# Patient Record
Sex: FEMALE | Race: WHITE | NOT HISPANIC OR LATINO
[De-identification: names, ages, dates, MRNs, and addresses within clinical notes are randomized per-mention and may not be internally consistent; named-entity substitution may affect disease eponyms.]

---

## 2017-06-21 ENCOUNTER — APPOINTMENT (OUTPATIENT)
Dept: ULTRASOUND IMAGING | Facility: CLINIC | Age: 77
End: 2017-06-21

## 2020-10-05 ENCOUNTER — APPOINTMENT (OUTPATIENT)
Dept: RADIOLOGY | Facility: CLINIC | Age: 80
End: 2020-10-05

## 2020-10-05 ENCOUNTER — APPOINTMENT (OUTPATIENT)
Dept: MRI IMAGING | Facility: CLINIC | Age: 80
End: 2020-10-05

## 2021-03-22 ENCOUNTER — RESULT REVIEW (OUTPATIENT)
Age: 81
End: 2021-03-22

## 2021-03-22 ENCOUNTER — APPOINTMENT (OUTPATIENT)
Dept: OBGYN | Facility: CLINIC | Age: 81
End: 2021-03-22
Payer: MEDICARE

## 2021-03-22 PROCEDURE — G0101: CPT

## 2021-03-22 PROCEDURE — 99072 ADDL SUPL MATRL&STAF TM PHE: CPT

## 2022-05-13 ENCOUNTER — EMERGENCY (EMERGENCY)
Facility: HOSPITAL | Age: 82
LOS: 1 days | Discharge: HOME | End: 2022-05-13
Attending: PERSONAL EMERGENCY RESPONSE ATTENDANT | Admitting: PERSONAL EMERGENCY RESPONSE ATTENDANT
Payer: MEDICARE

## 2022-05-13 VITALS
OXYGEN SATURATION: 98 % | RESPIRATION RATE: 16 BRPM | HEART RATE: 114 BPM | DIASTOLIC BLOOD PRESSURE: 88 MMHG | TEMPERATURE: 98 F | SYSTOLIC BLOOD PRESSURE: 139 MMHG

## 2022-05-13 PROCEDURE — 99053 MED SERV 10PM-8AM 24 HR FAC: CPT

## 2022-05-13 PROCEDURE — 99284 EMERGENCY DEPT VISIT MOD MDM: CPT
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
## 2022-05-14 VITALS
HEART RATE: 98 BPM | TEMPERATURE: 99 F | RESPIRATION RATE: 16 BRPM | SYSTOLIC BLOOD PRESSURE: 133 MMHG | DIASTOLIC BLOOD PRESSURE: 92 MMHG | OXYGEN SATURATION: 100 %

## 2022-05-14 PROCEDURE — 73564 X-RAY EXAM KNEE 4 OR MORE: CPT | Mod: 26,50

## 2022-05-14 RX ORDER — TETANUS TOXOID, REDUCED DIPHTHERIA TOXOID AND ACELLULAR PERTUSSIS VACCINE, ADSORBED 5; 2.5; 8; 8; 2.5 [IU]/.5ML; [IU]/.5ML; UG/.5ML; UG/.5ML; UG/.5ML
0.5 SUSPENSION INTRAMUSCULAR ONCE
Refills: 0 | Status: COMPLETED | OUTPATIENT
Start: 2022-05-14 | End: 2022-05-14

## 2022-05-14 RX ADMIN — TETANUS TOXOID, REDUCED DIPHTHERIA TOXOID AND ACELLULAR PERTUSSIS VACCINE, ADSORBED 0.5 MILLILITER(S): 5; 2.5; 8; 8; 2.5 SUSPENSION INTRAMUSCULAR at 02:47
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
## 2022-05-14 NOTE — ED PROVIDER NOTE - NSFOLLOWUPINSTRUCTIONS_ED_ALL_ED_FT
Please follow up with your primary care doctor.     You may take tylenol or ibuprofen for pain. To control your pain at home, you should take Ibuprofen 400 mg along with Tylenol 650mg-1000mg every 6 to 8 hours. Limit your maximum daily Tylenol from all sources to 4000mg. Be aware that many other medications contain acetaminophen which is also known as Tylenol. Taking Tylenol and Ibuprofen together has been shown to be more effective at relieving pain than taking them separately. These are both over the counter medications that you can  at your local pharmacy without a prescription. You need to respect all of the warnings on the bottles. You shouldn’t take these medications for more than a week without following up with your doctor. Both medications come with certain risks and side effects that you need to discuss with your doctor, especially if you are taking them for a prolonged period    Please return to the emergency department with any new or worsening symptoms, including:  -Severe headache  -You are unsteady on your feet  -You are confused or faint  -Chest pain  -Shortness of breath        Fall Prevention    WHAT YOU NEED TO KNOW:    Fall prevention includes ways to make your home and other areas safer. It also includes ways you can move more carefully to prevent a fall. Health conditions that cause changes in your blood pressure, vision, or muscle strength and coordination may increase your risk for falls. Medicines may also increase your risk for falls if they make you dizzy, weak, or sleepy.    DISCHARGE INSTRUCTIONS:    Call 911 or have someone else call if:  You have fallen and are unconscious.  You have fallen and cannot move part of your body.  Contact your healthcare provider if:  You have fallen and have pain or a headache.  You have questions or concerns about your condition or care.  Fall prevention tips:  Stand or sit up slowly. This may help you keep your balance and prevent falls.  Use assistive devices as directed. Your healthcare provider may suggest that you use a cane or walker to help you keep your balance. You may need to have grab bars put in your bathroom near the toilet or in the shower.  Wear shoes that fit well and have soles that . Wear shoes both inside and outside. Use slippers with good . Do not wear shoes with high heels.  Wear a personal alarm. This is a device that allows you to call 911 if you fall and need help. Ask your healthcare provider for more information.  Stay active. Exercise can help strengthen your muscles and improve your balance. Your healthcare provider may recommend water aerobics or walking. He or she may also recommend physical therapy to improve your coordination. Never start an exercise program without talking to your healthcare provider first.  Walking for Exercise  Manage your medical conditions. Keep all appointments with your healthcare providers. Visit your eye doctor as directed.  Home safety tips:  Fall Prevention for Adults  Add items to prevent falls in the bathroom. Put nonslip strips on your bath or shower floor to prevent you from slipping. Use a bath mat if you do not have carpet in the bathroom. This will prevent you from falling when you step out of the bath or shower. Use a shower seat so you do not need to stand while you shower. Sit on the toilet or a chair in your bathroom to dry yourself and put on clothing. This will prevent you from losing your balance from drying or dressing yourself while you are standing.  Keep paths clear. Remove books, shoes, and other objects from walkways and stairs. Place cords for telephones and lamps out of the way so that you do not need to walk over them. Tape them down if you cannot move them. Remove small rugs. If you cannot remove a rug, secure it with double-sided tape. This will prevent you from tripping.  Install bright lights in your home. Use night lights to help light paths to the bathroom or kitchen. Always turn on the light before you start walking.  Keep items you use often on shelves within reach. Do not use a step stool to help you reach an item.  Paint or place reflective tape on the edges of your stairs. This will help you see the stairs better.  Follow up with your healthcare provider as directed: Write down your questions so you remember to ask them during your visits.
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
## 2022-05-14 NOTE — ED PROVIDER NOTE - NS ED ROS FT
CONST: no fevers, no chills  EYES: no pain, no vision changes  ENT: no sore throat, no ear pain, no change in hearing  CV: no chest pain, no leg swelling  RESP: no shortness of breath, no cough  ABD: no abdominal pain, no nausea, no vomiting, no diarrhea  : no dysuria, no flank pain, no hematuria  MSK: no back pain, no extremity pain  NEURO: no headache or additional neurologic complaints  HEME: no easy bleeding  SKIN:  no rash, +skin abrasions

## 2022-05-14 NOTE — ED PROVIDER NOTE - PATIENT PORTAL LINK FT
You can access the FollowMyHealth Patient Portal offered by [facility] by registering at the following website: [email or web address]. By joining [employer]’s FollowMyHealth portal, you will also be able to view your health information using other applications (apps) compatible with our system.

## 2022-05-14 NOTE — PROVIDER CONTACT NOTE (OTHER) - ASSESSMENT
SW assistance requested by MD for d/c transport. Writer met with pt at bedside to discuss options. Pt reports no longer needing a taxi as son, Eduardo, called and would be coming to pick pt up. SW remains available as needed.
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
## 2022-05-14 NOTE — ED ADULT NURSE NOTE - NSIMPLEMENTINTERV_GEN_ALL_ED
Implemented All Fall Risk Interventions:  [place] to call system. Call bell, personal items and telephone within reach. Instruct patient to call for assistance. Room bathroom lighting operational. Non-slip footwear when patient is off stretcher. Physically safe environment: no spills, clutter or unnecessary equipment. Stretcher in lowest position, wheels locked, appropriate side rails in place. Provide visual cue, wrist band, yellow gown, etc. Monitor gait and stability. Monitor for mental status changes and reorient to person, place, and time. Review medications for side effects contributing to fall risk. Reinforce activity limits and safety measures with patient and family.

## 2022-05-14 NOTE — ED PROVIDER NOTE - PROGRESS NOTE DETAILS
[place] Chris PGY-2: Patient ambulatory without issue. In no pain. XR with R knee effusion, no acute pathology. To RAY.

## 2022-05-14 NOTE — ED PROVIDER NOTE - CLINICAL SUMMARY MEDICAL DECISION MAKING FREE TEXT BOX
81 year old F with PMH HTN, hypothyroidism presenting with bilateral knee injuries. VSS, well appearing. AOx3, GCS 15. Remembers all events of the evening. No concern for acute hypothermia. Given patient has non focal neuro exam and is AO, will defer CTH. To get bilateral knee xrays (r/o fracture, dislocation), TDAP. If ambulatory without issue, DC.

## 2022-05-14 NOTE — ED PROVIDER NOTE - PHYSICAL EXAMINATION
GENERAL: Awake, alert, NAD  HEENT: NC/AT, moist mucous membranes, PERRL, EOMI  LUNGS: CTAB, no wheezes or crackles   CARDIAC: RRR, no m/r/g  ABDOMEN: Soft, normal BS, non tender, non distended, no rebound, no guarding  BACK: No midline spinal tenderness, no CVA tenderness  EXT: No edema, no calf tenderness, 2+ DP pulses bilaterally, no deformities. R knee effusion. Full ROM bilateral knee joints, no patellar tenderness, distally neurovascularly intact.   NEURO: A&Ox3. Moving all extremities.  SKIN: Warm and dry. No rash. Superficial abrasions to knees bilaterally.   PSYCH: Normal affect. GENERAL: Awake, alert, NAD  HEENT: NC/AT, moist mucous membranes, PERRL, EOMI  LUNGS: CTAB, no wheezes or crackles   CARDIAC: RRR, no m/r/g  ABDOMEN: Soft, normal BS, non tender, non distended, no rebound, no guarding  BACK: No midline spinal tenderness, no CVA tenderness  EXT: No edema, no calf tenderness, 2+ DP pulses bilaterally, no deformities. R knee effusion. Full ROM bilateral knee joints, no patellar tenderness, distally neurovascularly intact.   NEURO: A&Ox3. Moving all extremities. GCS 15.   SKIN: Warm and dry. No rash. Superficial abrasions to knees bilaterally.   PSYCH: Normal affect.

## 2023-01-04 PROBLEM — E03.9 HYPOTHYROIDISM, UNSPECIFIED: Chronic | Status: ACTIVE | Noted: 2022-05-14

## 2023-01-04 PROBLEM — I10 ESSENTIAL (PRIMARY) HYPERTENSION: Chronic | Status: ACTIVE | Noted: 2022-05-14

## 2023-01-25 ENCOUNTER — APPOINTMENT (OUTPATIENT)
Dept: OTOLARYNGOLOGY | Facility: CLINIC | Age: 83
End: 2023-01-25
Payer: MEDICARE

## 2023-01-25 VITALS
HEART RATE: 99 BPM | WEIGHT: 180 LBS | BODY MASS INDEX: 29.99 KG/M2 | TEMPERATURE: 97.7 F | SYSTOLIC BLOOD PRESSURE: 167 MMHG | HEIGHT: 65 IN | DIASTOLIC BLOOD PRESSURE: 88 MMHG

## 2023-01-25 DIAGNOSIS — H91.90 UNSPECIFIED HEARING LOSS, UNSPECIFIED EAR: ICD-10-CM

## 2023-01-25 PROCEDURE — 99203 OFFICE O/P NEW LOW 30 MIN: CPT | Mod: 25

## 2023-01-25 PROCEDURE — 69210 REMOVE IMPACTED EAR WAX UNI: CPT

## 2023-01-25 NOTE — END OF VISIT
[FreeTextEntry3] : I personally saw and examined EBEN WILKS in detail.  I spoke to ITZ Ho regarding the assessment and plan of care. I performed the procedures and relevant physical exam.  I have reviewed the above assessment and plan of care and I agree.  I have made changes to the body of the note wherever necessary and appropriate.\par

## 2023-01-25 NOTE — ASSESSMENT
[FreeTextEntry1] : Ms. WILKS is a 82 year female with hearing loss. On exam she has bilateral impacted cerumen\par \par - cerumen cleared today\par - Audio to be done at [facility]\par annual f/u recommend for ear check \par

## 2023-01-25 NOTE — HISTORY OF PRESENT ILLNESS
[de-identified] : Ms. WILKS is a 82 year female sent by Audiologist for ear check, she has been using hearing aids for many years. denies pain, tinnitus or discharge\par uses [facility] for her audiology needs
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
## 2023-11-09 ENCOUNTER — APPOINTMENT (OUTPATIENT)
Dept: OTOLARYNGOLOGY | Facility: CLINIC | Age: 83
End: 2023-11-09
Payer: MEDICARE

## 2023-11-09 VITALS
DIASTOLIC BLOOD PRESSURE: 84 MMHG | TEMPERATURE: 97.7 F | WEIGHT: 180 LBS | BODY MASS INDEX: 29.99 KG/M2 | HEART RATE: 89 BPM | HEIGHT: 65 IN | SYSTOLIC BLOOD PRESSURE: 158 MMHG

## 2023-11-09 PROCEDURE — 69200 CLEAR OUTER EAR CANAL: CPT | Mod: LT

## 2023-11-09 PROCEDURE — 99214 OFFICE O/P EST MOD 30 MIN: CPT | Mod: 25

## 2023-11-10 ENCOUNTER — APPOINTMENT (OUTPATIENT)
Dept: OTOLARYNGOLOGY | Facility: CLINIC | Age: 83
End: 2023-11-10
Payer: MEDICARE

## 2023-11-10 VITALS — BODY MASS INDEX: 29.99 KG/M2 | WEIGHT: 180 LBS | HEIGHT: 65 IN

## 2023-11-10 DIAGNOSIS — H61.23 IMPACTED CERUMEN, BILATERAL: ICD-10-CM

## 2023-11-10 DIAGNOSIS — T16.2XXA FOREIGN BODY IN LEFT EAR, INITIAL ENCOUNTER: ICD-10-CM

## 2023-11-10 DIAGNOSIS — H90.3 SENSORINEURAL HEARING LOSS, BILATERAL: ICD-10-CM

## 2023-11-10 PROCEDURE — 69200 CLEAR OUTER EAR CANAL: CPT | Mod: LT

## 2023-11-10 PROCEDURE — 99213 OFFICE O/P EST LOW 20 MIN: CPT | Mod: 25

## 2023-11-10 PROCEDURE — 69210 REMOVE IMPACTED EAR WAX UNI: CPT | Mod: RT

## 2024-05-21 ENCOUNTER — EMERGENCY (EMERGENCY)
Facility: HOSPITAL | Age: 84
LOS: 1 days | Discharge: HOME | End: 2024-05-21
Attending: EMERGENCY MEDICINE | Admitting: EMERGENCY MEDICINE
Payer: MEDICARE

## 2024-05-21 VITALS
RESPIRATION RATE: 18 BRPM | DIASTOLIC BLOOD PRESSURE: 84 MMHG | OXYGEN SATURATION: 97 % | TEMPERATURE: 98 F | SYSTOLIC BLOOD PRESSURE: 149 MMHG | HEART RATE: 115 BPM

## 2024-05-21 VITALS
OXYGEN SATURATION: 98 % | RESPIRATION RATE: 18 BRPM | TEMPERATURE: 98 F | HEART RATE: 77 BPM | SYSTOLIC BLOOD PRESSURE: 138 MMHG | DIASTOLIC BLOOD PRESSURE: 68 MMHG

## 2024-05-21 LAB
ALBUMIN SERPL ELPH-MCNC: 4 G/DL — SIGNIFICANT CHANGE UP (ref 3.3–5)
ALP SERPL-CCNC: 69 U/L — SIGNIFICANT CHANGE UP (ref 40–120)
ALT FLD-CCNC: 35 U/L — HIGH (ref 4–33)
ANION GAP SERPL CALC-SCNC: 15 MMOL/L — HIGH (ref 7–14)
APPEARANCE UR: ABNORMAL
APTT BLD: 29.3 SEC — SIGNIFICANT CHANGE UP (ref 24.5–35.6)
AST SERPL-CCNC: 32 U/L — SIGNIFICANT CHANGE UP (ref 4–32)
BACTERIA # UR AUTO: ABNORMAL /HPF
BASE EXCESS BLDV CALC-SCNC: 0.9 MMOL/L — SIGNIFICANT CHANGE UP (ref -2–3)
BASE EXCESS BLDV CALC-SCNC: 2.2 MMOL/L — SIGNIFICANT CHANGE UP (ref -2–3)
BASOPHILS # BLD AUTO: 0.04 K/UL — SIGNIFICANT CHANGE UP (ref 0–0.2)
BASOPHILS NFR BLD AUTO: 0.3 % — SIGNIFICANT CHANGE UP (ref 0–2)
BILIRUB DIRECT SERPL-MCNC: <0.2 MG/DL — SIGNIFICANT CHANGE UP (ref 0–0.3)
BILIRUB INDIRECT FLD-MCNC: >0.3 MG/DL — SIGNIFICANT CHANGE UP (ref 0–1)
BILIRUB SERPL-MCNC: 0.5 MG/DL — SIGNIFICANT CHANGE UP (ref 0.2–1.2)
BILIRUB SERPL-MCNC: 0.5 MG/DL — SIGNIFICANT CHANGE UP (ref 0.2–1.2)
BILIRUB UR-MCNC: NEGATIVE — SIGNIFICANT CHANGE UP
BLD GP AB SCN SERPL QL: NEGATIVE — SIGNIFICANT CHANGE UP
BUN SERPL-MCNC: 13 MG/DL — SIGNIFICANT CHANGE UP (ref 7–23)
CA-I SERPL-SCNC: 1.17 MMOL/L — SIGNIFICANT CHANGE UP (ref 1.15–1.33)
CA-I SERPL-SCNC: 1.21 MMOL/L — SIGNIFICANT CHANGE UP (ref 1.15–1.33)
CALCIUM SERPL-MCNC: 9.6 MG/DL — SIGNIFICANT CHANGE UP (ref 8.4–10.5)
CAST: 0 /LPF — SIGNIFICANT CHANGE UP (ref 0–4)
CHLORIDE BLDV-SCNC: 104 MMOL/L — SIGNIFICANT CHANGE UP (ref 96–108)
CHLORIDE BLDV-SCNC: 106 MMOL/L — SIGNIFICANT CHANGE UP (ref 96–108)
CHLORIDE SERPL-SCNC: 102 MMOL/L — SIGNIFICANT CHANGE UP (ref 98–107)
CO2 BLDV-SCNC: 27.3 MMOL/L — HIGH (ref 22–26)
CO2 BLDV-SCNC: 30.4 MMOL/L — HIGH (ref 22–26)
CO2 SERPL-SCNC: 23 MMOL/L — SIGNIFICANT CHANGE UP (ref 22–31)
COLOR SPEC: YELLOW — SIGNIFICANT CHANGE UP
CREAT SERPL-MCNC: 0.56 MG/DL — SIGNIFICANT CHANGE UP (ref 0.5–1.3)
DIFF PNL FLD: ABNORMAL
EGFR: 90 ML/MIN/1.73M2 — SIGNIFICANT CHANGE UP
EOSINOPHIL # BLD AUTO: 0.04 K/UL — SIGNIFICANT CHANGE UP (ref 0–0.5)
EOSINOPHIL NFR BLD AUTO: 0.3 % — SIGNIFICANT CHANGE UP (ref 0–6)
FLUAV AG NPH QL: SIGNIFICANT CHANGE UP
FLUBV AG NPH QL: SIGNIFICANT CHANGE UP
GAS PNL BLDV: 134 MMOL/L — LOW (ref 136–145)
GAS PNL BLDV: 136 MMOL/L — SIGNIFICANT CHANGE UP (ref 136–145)
GAS PNL BLDV: SIGNIFICANT CHANGE UP
GAS PNL BLDV: SIGNIFICANT CHANGE UP
GLUCOSE BLDV-MCNC: 126 MG/DL — HIGH (ref 70–99)
GLUCOSE BLDV-MCNC: 153 MG/DL — HIGH (ref 70–99)
GLUCOSE SERPL-MCNC: 153 MG/DL — HIGH (ref 70–99)
GLUCOSE UR QL: NEGATIVE MG/DL — SIGNIFICANT CHANGE UP
HCO3 BLDV-SCNC: 26 MMOL/L — SIGNIFICANT CHANGE UP (ref 22–29)
HCO3 BLDV-SCNC: 29 MMOL/L — SIGNIFICANT CHANGE UP (ref 22–29)
HCT VFR BLD CALC: 44.6 % — SIGNIFICANT CHANGE UP (ref 34.5–45)
HCT VFR BLDA CALC: 46 % — SIGNIFICANT CHANGE UP (ref 34.5–46.5)
HCT VFR BLDA CALC: 47 % — HIGH (ref 34.5–46.5)
HGB BLD CALC-MCNC: 15.3 G/DL — SIGNIFICANT CHANGE UP (ref 11.7–16.1)
HGB BLD CALC-MCNC: 15.5 G/DL — SIGNIFICANT CHANGE UP (ref 11.7–16.1)
HGB BLD-MCNC: 15.1 G/DL — SIGNIFICANT CHANGE UP (ref 11.5–15.5)
IANC: 11.53 K/UL — HIGH (ref 1.8–7.4)
IMM GRANULOCYTES NFR BLD AUTO: 0.4 % — SIGNIFICANT CHANGE UP (ref 0–0.9)
INR BLD: 1.1 RATIO — SIGNIFICANT CHANGE UP (ref 0.85–1.18)
KETONES UR-MCNC: NEGATIVE MG/DL — SIGNIFICANT CHANGE UP
LACTATE BLDV-MCNC: 2.2 MMOL/L — HIGH (ref 0.5–2)
LACTATE BLDV-MCNC: 2.5 MMOL/L — HIGH (ref 0.5–2)
LEUKOCYTE ESTERASE UR-ACNC: ABNORMAL
LIDOCAIN IGE QN: 17 U/L — SIGNIFICANT CHANGE UP (ref 7–60)
LYMPHOCYTES # BLD AUTO: 1.54 K/UL — SIGNIFICANT CHANGE UP (ref 1–3.3)
LYMPHOCYTES # BLD AUTO: 10.9 % — LOW (ref 13–44)
MAGNESIUM SERPL-MCNC: 2 MG/DL — SIGNIFICANT CHANGE UP (ref 1.6–2.6)
MCHC RBC-ENTMCNC: 31.1 PG — SIGNIFICANT CHANGE UP (ref 27–34)
MCHC RBC-ENTMCNC: 33.9 GM/DL — SIGNIFICANT CHANGE UP (ref 32–36)
MCV RBC AUTO: 91.8 FL — SIGNIFICANT CHANGE UP (ref 80–100)
MONOCYTES # BLD AUTO: 0.9 K/UL — SIGNIFICANT CHANGE UP (ref 0–0.9)
MONOCYTES NFR BLD AUTO: 6.4 % — SIGNIFICANT CHANGE UP (ref 2–14)
NEUTROPHILS # BLD AUTO: 11.53 K/UL — HIGH (ref 1.8–7.4)
NEUTROPHILS NFR BLD AUTO: 81.7 % — HIGH (ref 43–77)
NITRITE UR-MCNC: POSITIVE
NRBC # BLD: 0 /100 WBCS — SIGNIFICANT CHANGE UP (ref 0–0)
NRBC # FLD: 0 K/UL — SIGNIFICANT CHANGE UP (ref 0–0)
PCO2 BLDV: 42 MMHG — SIGNIFICANT CHANGE UP (ref 39–52)
PCO2 BLDV: 51 MMHG — SIGNIFICANT CHANGE UP (ref 39–52)
PH BLDV: 7.36 — SIGNIFICANT CHANGE UP (ref 7.32–7.43)
PH BLDV: 7.4 — SIGNIFICANT CHANGE UP (ref 7.32–7.43)
PH UR: 6 — SIGNIFICANT CHANGE UP (ref 5–8)
PLATELET # BLD AUTO: 282 K/UL — SIGNIFICANT CHANGE UP (ref 150–400)
PO2 BLDV: 36 MMHG — SIGNIFICANT CHANGE UP (ref 25–45)
PO2 BLDV: <20 MMHG — LOW (ref 25–45)
POTASSIUM BLDV-SCNC: 4 MMOL/L — SIGNIFICANT CHANGE UP (ref 3.5–5.1)
POTASSIUM BLDV-SCNC: 4.4 MMOL/L — SIGNIFICANT CHANGE UP (ref 3.5–5.1)
POTASSIUM SERPL-MCNC: 4.3 MMOL/L — SIGNIFICANT CHANGE UP (ref 3.5–5.3)
POTASSIUM SERPL-SCNC: 4.3 MMOL/L — SIGNIFICANT CHANGE UP (ref 3.5–5.3)
PROT SERPL-MCNC: 6.7 G/DL — SIGNIFICANT CHANGE UP (ref 6–8.3)
PROT UR-MCNC: NEGATIVE MG/DL — SIGNIFICANT CHANGE UP
PROTHROM AB SERPL-ACNC: 12.3 SEC — SIGNIFICANT CHANGE UP (ref 9.5–13)
RBC # BLD: 4.86 M/UL — SIGNIFICANT CHANGE UP (ref 3.8–5.2)
RBC # FLD: 12.4 % — SIGNIFICANT CHANGE UP (ref 10.3–14.5)
RBC CASTS # UR COMP ASSIST: 1 /HPF — SIGNIFICANT CHANGE UP (ref 0–4)
RH IG SCN BLD-IMP: POSITIVE — SIGNIFICANT CHANGE UP
RSV RNA NPH QL NAA+NON-PROBE: SIGNIFICANT CHANGE UP
SAO2 % BLDV: 16.8 % — LOW (ref 67–88)
SAO2 % BLDV: 58.7 % — LOW (ref 67–88)
SARS-COV-2 RNA SPEC QL NAA+PROBE: SIGNIFICANT CHANGE UP
SODIUM SERPL-SCNC: 140 MMOL/L — SIGNIFICANT CHANGE UP (ref 135–145)
SP GR SPEC: 1.01 — SIGNIFICANT CHANGE UP (ref 1–1.03)
SQUAMOUS # UR AUTO: 6 /HPF — HIGH (ref 0–5)
UROBILINOGEN FLD QL: 0.2 MG/DL — SIGNIFICANT CHANGE UP (ref 0.2–1)
WBC # BLD: 14.1 K/UL — HIGH (ref 3.8–10.5)
WBC # FLD AUTO: 14.1 K/UL — HIGH (ref 3.8–10.5)
WBC UR QL: 44 /HPF — HIGH (ref 0–5)

## 2024-05-21 PROCEDURE — 99283 EMERGENCY DEPT VISIT LOW MDM: CPT | Mod: GC

## 2024-05-21 PROCEDURE — 99053 MED SERV 10PM-8AM 24 HR FAC: CPT

## 2024-05-21 PROCEDURE — 99285 EMERGENCY DEPT VISIT HI MDM: CPT

## 2024-05-21 PROCEDURE — 93010 ELECTROCARDIOGRAM REPORT: CPT

## 2024-05-21 PROCEDURE — 74177 CT ABD & PELVIS W/CONTRAST: CPT | Mod: 26,MC

## 2024-05-21 RX ORDER — CEFUROXIME AXETIL 250 MG
1 TABLET ORAL
Qty: 12 | Refills: 0
Start: 2024-05-21 | End: 2024-05-26

## 2024-05-21 RX ORDER — KETOROLAC TROMETHAMINE 30 MG/ML
15 SYRINGE (ML) INJECTION ONCE
Refills: 0 | Status: DISCONTINUED | OUTPATIENT
Start: 2024-05-21 | End: 2024-05-21

## 2024-05-21 RX ORDER — SODIUM CHLORIDE 9 MG/ML
1000 INJECTION, SOLUTION INTRAVENOUS ONCE
Refills: 0 | Status: COMPLETED | OUTPATIENT
Start: 2024-05-21 | End: 2024-05-21

## 2024-05-21 RX ORDER — ACETAMINOPHEN 500 MG
1000 TABLET ORAL ONCE
Refills: 0 | Status: COMPLETED | OUTPATIENT
Start: 2024-05-21 | End: 2024-05-21

## 2024-05-21 RX ORDER — CEFTRIAXONE 500 MG/1
1000 INJECTION, POWDER, FOR SOLUTION INTRAMUSCULAR; INTRAVENOUS ONCE
Refills: 0 | Status: COMPLETED | OUTPATIENT
Start: 2024-05-21 | End: 2024-05-21

## 2024-05-21 RX ORDER — LIDOCAINE HYDROCHLORIDE AND EPINEPHRINE 10; 10 MG/ML; UG/ML
10 INJECTION, SOLUTION INFILTRATION; PERINEURAL ONCE
Refills: 0 | Status: COMPLETED | OUTPATIENT
Start: 2024-05-21 | End: 2024-05-21

## 2024-05-21 RX ORDER — SODIUM CHLORIDE 9 MG/ML
500 INJECTION INTRAMUSCULAR; INTRAVENOUS; SUBCUTANEOUS ONCE
Refills: 0 | Status: COMPLETED | OUTPATIENT
Start: 2024-05-21 | End: 2024-05-21

## 2024-05-21 RX ADMIN — SODIUM CHLORIDE 500 MILLILITER(S): 9 INJECTION INTRAMUSCULAR; INTRAVENOUS; SUBCUTANEOUS at 12:39

## 2024-05-21 RX ADMIN — SODIUM CHLORIDE 1000 MILLILITER(S): 9 INJECTION, SOLUTION INTRAVENOUS at 09:00

## 2024-05-21 RX ADMIN — Medication 15 MILLIGRAM(S): at 04:53

## 2024-05-21 RX ADMIN — SODIUM CHLORIDE 1000 MILLILITER(S): 9 INJECTION, SOLUTION INTRAVENOUS at 05:55

## 2024-05-21 RX ADMIN — LIDOCAINE HYDROCHLORIDE AND EPINEPHRINE 10 MILLILITER(S): 10; 10 INJECTION, SOLUTION INFILTRATION; PERINEURAL at 12:55

## 2024-05-21 RX ADMIN — Medication 1000 MILLIGRAM(S): at 04:53

## 2024-05-21 RX ADMIN — Medication 15 MILLIGRAM(S): at 03:53

## 2024-05-21 RX ADMIN — Medication 400 MILLIGRAM(S): at 03:53

## 2024-05-21 RX ADMIN — CEFTRIAXONE 100 MILLIGRAM(S): 500 INJECTION, POWDER, FOR SOLUTION INTRAMUSCULAR; INTRAVENOUS at 05:55

## 2024-05-21 NOTE — ED PROVIDER NOTE - CARE PROVIDER_API CALL
Johanne Dangelo  Urology  [address] [place] [id number]  Phone: [phone]  Fax: [phone]  Follow Up Time: 7-10 Days    Bashir Chacko  Obstetrics and Gynecology  [facility], [address]  [place] [address]  Phone: [phone]  Fax: [phone]  Follow Up Time: 7-10 Days

## 2024-05-21 NOTE — CONSULT NOTE ADULT - SUBJECTIVE AND OBJECTIVE BOX
HPI  Patient is a 84yo F presenting with lower abdominal pain for the past week with CT scan concerning for retention and hydrometrocolpos. Patient states that she has had no difficult urinating and states that she feels like she empties completely. In the ED patients vitals have been stable and labs are notable for a WBC of 14.1 and a Cr of .56. CT scan was done which showed distended bladder and hydrometrocolpos. 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 SPT placed at bedside without difficulty and positive return of urine under ultrasound guidance.   PAST MEDICAL & SURGICAL HISTORY:  Hypertension      Hypothyroidism          MEDICATIONS  (STANDING):  lidocaine 1%/epinephrine 1:100,000 Inj 10 milliLiter(s) Local Injection Once    MEDICATIONS  (PRN):      FAMILY HISTORY:      Allergies    No Known Allergies    Intolerances        SOCIAL HISTORY:    REVIEW OF SYSTEMS: Otherwise negative as stated in [facility]    Physical Exam  Vital signs  T(C): 37.1 (24 @ 08:18), Max: 37.1 (24 @ 08:18)  HR: 109 (24 @ 08:18)  BP: 152/88 (24 @ 08:18)  SpO2: 97% (24 @ 08:18)  Wt(kg): --    Output      Gen:  NAD    Pulm:  No respiratory distress  	  CV:  RRR    GI:  S/ND/NT    :  SPT draining CYU    MSK:      LABS:       @ 03:41    WBC 14.10 / Hct 44.6  / SCr 0.56         140  |  102  |  13  ----------------------------<  153<H>  4.3   |  23  |  0.56    Ca    9.6      21 May 2024 03:41  Mg     2.00         TPro  6.7  /  Alb  4.0  /  TBili  0.5  /  DBili  <0.2  /  AST  32  /  ALT  35<H>  /  AlkPhos  69      PT/INR - ( 21 May 2024 03:41 )   PT: 12.3 sec;   INR: 1.10 ratio         PTT - ( 21 May 2024 03:41 )  PTT:29.3 sec  Urinalysis Basic - ( 21 May 2024 05:03 )    Color: Yellow / Appearance: Turbid / S.014 / pH: x  Gluc: x / Ketone: Negative mg/dL  / Bili: Negative / Urobili: 0.2 mg/dL   Blood: x / Protein: Negative mg/dL / Nitrite: Positive   Leuk Esterase: Small / RBC: 1 /HPF / WBC 44 /HPF   Sq Epi: x / Non Sq Epi: 6 /HPF / Bacteria: Many /HPF        Urine Cx:  Blood Cx:    RADIOLOGY:    ACC: [id number] EXAM:  CT ABDOMEN AND PELVIS IC   ORDERED BY: RAEGAN FRAGA     PROCEDURE DATE:  2024          INTERPRETATION:  CLINICAL INFORMATION: Right lower quadrant pain    COMPARISON: None.    CONTRAST/COMPLICATIONS:  IV Contrast: Omnipaque 350  90 cc administered   10 cc discarded  Oral Contrast: NONE  Complications: None reported at time of study completion    PROCEDURE:  CT of the Abdomen and Pelvis was performed.  Sagittal and coronal reformats were performed.    FINDINGS:  LOWER CHEST: Mild bibasilar dependent atelectasis. Coronary artery   calcifications. Tiny hiatal hernia.    LIVER: A 1 cm indeterminant hypodense focus in segment 7.  BILE DUCTS: Normal caliber.  GALLBLADDER: Cholelithiasis.  SPLEEN: Calcified granulomas.  PANCREAS: Within normal limits.  ADRENALS: Nodular thickening of the left adrenal gland.  KIDNEYS/URETERS: Within normal limits.    BLADDER: Within normal limits.  REPRODUCTIVE ORGANS: Fluid distention of the vagina. Fibroid uterus.    BOWEL: Diverticulosis of the descending and sigmoid colon. No evidence   for active bowel inflammation. No bowel obstruction. Appendix is normal.  PERITONEUM: No ascites.  VESSELS: Within normal limits.  RETROPERITONEUM/LYMPH NODES: No lymphadenopathy.  ABDOMINAL WALL: Within normal limits.  BONES: Degenerative changes.    IMPRESSION:  Normal appendix. No acute abdominal pathology.    Hydrometrocolpos. Please correlate for obstructive etiology versus   possible vesicovaginal reflux.    --- End of Report ---            GILLIAN SO MD; Attending Radiologist  This document has been electronically signed. May 21 2024  5:59AM

## 2024-05-21 NOTE — PROCEDURE NOTE - NSURITECHNIQUE_GU_A_CORE
Proper hand hygiene was performed/Sterile gloves were worn for the duration of the procedure/A sterile drape was used to cover all adjacent areas/The site was cleaned with soap/water and sterile solution (betadine)/The catheter was appropriately lubricated/The catheter was secured with a securement device (e.g. StatLock)/The urinary drainage system is closed at the end of the procedure/The collection bag is below the level of the patient and urinary bladder/All applicable medical record documentation is completed Home

## 2024-05-21 NOTE — CONSULT NOTE ADULT - SUBJECTIVE AND OBJECTIVE BOX
EBEN WILKS  83y  Female [id number]    HPI: 82 y/o P2 post menopausal female presenting with lower abdominal pain for the past three days. Patient states her pain has been primarily in the lower part of her abdomen, does not radiate. Patient states she has been urinating without issue at home. Reports urinating frequently but states this is normal for her and she feels as though she is emptying her bladder completely when she does urinate. She denies dysuria and hematuria. She denies a history of post menopausal vaginal bleeding, denies abnormal vaginal discharge, foul smell, fevers, chills, SOB, dizziness. Denies history of vaginal surgery in the past or anatomical abnormalities.         Name of GYN Physician: Does not have one   OBHx: x2   GynHx: Denies fibroids, cysts, endometriosis, STI's, Abnormal pap smears   PMH: HTN, HLD, Hypothyroidism  PSH: x2 ex-lap ovarian cystectomy   Meds: Atorvastatin, Enalapril, Amlodipine, Levothyroxine   Allx: NKDA  Social History:  Denies smoking use, drug use, alcohol use.     Vital Signs Last 24 Hrs  T(C): 37.1 (21 May 2024 08:18), Max: 37.1 (21 May 2024 08:18)  T(F): 98.7 (21 May 2024 08:18), Max: 98.7 (21 May 2024 08:18)  HR: 109 (21 May 2024 08:18) (79 - 115)  BP: 152/88 (21 May 2024 08:18) (133/63 - 152/88)  BP(mean): 104 (21 May 2024 07:12) (95 - 104)  RR: 16 (21 May 2024 08:18) (16 - 18)  SpO2: 97% (21 May 2024 08:18) (95% - 97%)    Parameters below as of 21 May 2024 08:18  Patient On (Oxygen Delivery Method): room air        Physical Exam:   General: sitting comfortably in bed, NAD   HEENT: neck supple, full ROM  Back: No CVA tenderness  Abd: Soft, non-tender, non-distended.   : No vaginal introitus opening, labia appear fused together. No bleeding, purulence, or erythema noted. 0.5cm opening at area of urethral meatus   Exam performed w/ N.Ginna         LABS:                        15.1   14.10 )-----------( 282      ( 21 May 2024 03:41 )             44.6         140  |  102  |  13  ----------------------------<  153<H>  4.3   |  23  |  0.56    Ca    9.6      21 May 2024 03:41  Mg     2.00         TPro  6.7  /  Alb  4.0  /  TBili  0.5  /  DBili  <0.2  /  AST  32  /  ALT  35<H>  /  AlkPhos  69      I&O's Detail    PT/INR - ( 21 May 2024 03:41 )   PT: 12.3 sec;   INR: 1.10 ratio         PTT - ( 21 May 2024 03:41 )  PTT:29.3 sec  Urinalysis Basic - ( 21 May 2024 05:03 )    Color: Yellow / Appearance: Turbid / S.014 / pH: x  Gluc: x / Ketone: Negative mg/dL  / Bili: Negative / Urobili: 0.2 mg/dL   Blood: x / Protein: Negative mg/dL / Nitrite: Positive   Leuk Esterase: Small / RBC: 1 /HPF / WBC 44 /HPF   Sq Epi: x / Non Sq Epi: 6 /HPF / Bacteria: Many /HPF        RADIOLOGY & ADDITIONAL STUDIES:    < from: CT Abdomen and Pelvis w/ IV Cont (24 @ 05:49) >    ACC: [id number] EXAM:  CT ABDOMEN AND PELVIS IC   ORDERED BY: RAEGAN FRAGA     PROCEDURE DATE:  2024          INTERPRETATION:  CLINICAL INFORMATION: Right lower quadrant pain    COMPARISON: None.    CONTRAST/COMPLICATIONS:  IV Contrast: Omnipaque 350  90 cc administered   10 cc discarded  Oral Contrast: NONE  Complications: None reported at time of study completion    PROCEDURE:  CT of the Abdomen and Pelvis was performed.  Sagittal and coronal reformats were performed.    FINDINGS:  LOWER CHEST: Mild bibasilar dependent atelectasis. Coronary artery   calcifications. Tiny hiatal hernia.    LIVER: A 1 cm indeterminant hypodense focus in segment 7.  BILE DUCTS: Normal caliber.  GALLBLADDER: Cholelithiasis.  SPLEEN: Calcified granulomas.  PANCREAS: Within normal limits.  ADRENALS: Nodular thickening of the left adrenal gland.  KIDNEYS/URETERS: Within normal limits.    BLADDER: Within normal limits.  REPRODUCTIVE ORGANS: Fluid distention of the vagina. Fibroid uterus.    BOWEL: Diverticulosis of the descending and sigmoid colon. No evidence   for active bowel inflammation. No bowel obstruction. Appendix is normal.  PERITONEUM: No ascites.  VESSELS: Within normal limits.  RETROPERITONEUM/LYMPH NODES: No lymphadenopathy.  ABDOMINAL WALL: Within normal limits.  BONES: Degenerative changes.    IMPRESSION:  Normal appendix. No acute abdominal pathology.    Hydrometrocolpos. Please correlate for obstructive etiology versus   possible vesicovaginal reflux.    --- End of Report ---        GILLIAN SO MD; Attending Radiologist  This document has been electronically signed. May 21 2024  5:59AM    < end of copied text >

## 2024-05-21 NOTE — ED PROVIDER NOTE - PATIENT PORTAL LINK FT
You can access the FollowMyHealth Patient Portal offered by [facility] by registering at the following website: [email or web address]. By joining [employer]’s FollowMyHealth portal, you will also be able to view your health information using other applications (apps) compatible with our system.

## 2024-05-21 NOTE — ED PROVIDER NOTE - PROGRESS NOTE DETAILS
GREGOR: Pt reassessed, bedside US shows 1000mL of retained urine despite pt able to provide UA/culture which was sent and shows likely UTI. Pt CT read by rads as "Normal appendix. No acute abdominal pathology. Hydrometrocolpos. Please correlate for obstructive etiology versus   possible vesicovaginal reflux". pt and granddaughter was made aware of condition. Will require outpt GYN follow up. Labs shows WBC 14, Lactic elevated. will repeat lactic acid. provide one dose ABx for UTI and reassess. GREGOR: Pt reassessed, bedside US shows 1000mL of retained urine despite pt able to provide UA/culture which was sent and shows likely UTI. Will place zimmerman for retention likely secondary to UTI. Pt CT read by rads as "Normal appendix. No acute abdominal pathology. Hydrometrocolpos. Please correlate for obstructive etiology versus   possible vesicovaginal reflux". pt and granddaughter was made aware of condition. Will require outpt GYN follow up. Labs shows WBC 14, Lactic elevated. will repeat lactic acid. provide one dose ABx for UTI and reassess. Dr. De La Rosa: Pt was signed out to me awaiting OB/Gyn exam for concern of fluid in the uterus preventing bladder emptying. OB/Gyn was previously consulted but pt was declining exam. Discussed with pt as pt's son, Arnold, is now present. He understands need for exam and explained to pt about need for exam. Pt is agreeing. OB/Gyn paged to inform them pt is amenable at this time with further exam. Dr. De La Rosa: Pt was seen by OB/Gyn who felt this was chronic and not acute and recommended estradiol cream and will f/u as outpt. Contacted Urology as pt still has distended blader on bedside US and unable to pass zimmerman. Bernadette PGY3: Urology was not able to place the zimmerman catheter due to obstruction from vaginal introitus. GYN team updated to see if there's any indication for possible intervention. Awaiting for update. If no intervention from GYN team, Urology may need to place a suprapubic catheter. Bernadette PGY3: patient is s/p suprapubic zimmerman catheter placement by Urology. patient to follow up with urologist Dr. Johanne Branham. Patient is pending urogyn evaluation. Bernadette PGY3: per urogyn, will discharge with estrogen cream and outpatient follow up. Will luis m.

## 2024-05-21 NOTE — ED PROVIDER NOTE - CARE PLAN
Principal Discharge DX:	Abdominal pain   1 Principal Discharge DX:	Acute UTI  Secondary Diagnosis:	Urinary retention

## 2024-05-21 NOTE — PROCEDURE NOTE - ADDITIONAL PROCEDURE DETAILS
Patient with complete closure of her vaginal introitus and consequently had urinary retention with zimmerman successful being unsuccessful due to pathology. Patient had a 14fr SPT tube placed with the Jean-Paul introducer with positive return of urine under ultrasound guidance. 1% lidocaine was applied to the skin and SPT tract for local pain control.     From urologic perspective patient can be discharged with SPT and leg bag. Patient can follow-up with Dr. Dangelo in the office.    [facility]  [address] [place] [address], [address] [address] [phone]

## 2024-05-21 NOTE — ED PROVIDER NOTE - OBJECTIVE STATEMENT
83-year-old female past medical history hypertension, hypothyroidism presenting with acute onset right lower quadrant pain that began 3 days ago and has been worsening.  The pain is constant, she never had pain like this before, is not associated with nausea vomiting diarrhea chest pain shortness of breath difficulty walking.  She denies any fever or chills, dysuria, hematuria, vaginal spotting.  She has had multiple removals of ovarian cysts in the past, is uncertain if she has had an appendectomy.  Pain does not seem to worsen with any particular movement, or eating.

## 2024-05-21 NOTE — CONSULT NOTE ADULT - ASSESSMENT
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 Discussed with gyn and they deferred surgical intervention so decision was made to place a SPT which was done at the bedside successfully under ultrasound guidance.     Plan  - SPT placed at bedside, patient to be discharged with SPT and leg bag  - Patient can follow with Dr. Dangelo in outpatient to discuss SPT management and future interventions for fused vaginal introitus  - Vaginal estrace cream   - Discussed with Dr. Zeng    [facility]  [address] [place] [address]  [phone]

## 2024-05-21 NOTE — ED PROVIDER NOTE - NSFOLLOWUPINSTRUCTIONS_ED_ALL_ED_FT
You were found to have a urine infection, and also urinary retention likely from the infection. This means that your bladder was not emptying after trying to urinate.     Please take your antibiotics, start TOMORROW 5/22 and take all the pills until completed.    Please follow with your Primary Care Doctor within 1 week.    Please follow up with Urology to have your Bacon catheter removed.    Please return to the ER if you develop abdominal pain, the Bacon is not draining urine, you develop fever despite being on Antibiotics, or if you develop any other new or concerning symptoms.     Please follow up with Gynecology about the fluid collection in your vagina. You were found to have a urine infection, and also urinary retention likely from the infection. This means that your bladder was not emptying after trying to urinate.     Please take your antibiotics, start TOMORROW 5/22 and take all the pills until completed.    Please follow up with Urology Office (Dr. Johanne Branham - please see the contact info on the front page) in the office for follow up and to have your Bacon catheter removed.    Please apply the prescribed Estrace Cream to the vagina three times daily and follow up with Dr. Chacko ([phone]) in the clinic within the next 7 days for further evaluation.     Please return to the ER if you develop abdominal pain, the Bacon is not draining urine, you develop fever despite being on Antibiotics, or if you develop any other new or concerning symptoms.     Please follow up with Gynecology about the fluid collection in your vagina.

## 2024-05-21 NOTE — ED PROVIDER NOTE - CLINICAL SUMMARY MEDICAL DECISION MAKING FREE TEXT BOX
83-year-old female past medical history hypertension hypothyroidism presenting with acute onset right lower quadrant pain for 3 days.  Vital signs on arrival significant for heart rate of 115, afebrile orally.  On physical exam patient has tenderness with guarding to the right lower quadrant suprapubic area, no rebound.  Is otherwise alert and oriented x 4, clear lungs and regular heart rate however heart rate is tachycardic.  Concern for appendicitis versus tendinitis versus diverticulitis versus kidney stone versus large ovarian cyst.  Will get CBC, CMP, lipase, coags and type and screen in case she needs operative intervention, will get a CT abdomen pelvis with IV contrast.  Will give Tylenol and Toradol for pain control.

## 2024-05-21 NOTE — ED ADULT NURSE REASSESSMENT NOTE - NS ED NURSE REASSESS COMMENT FT1
Attempt to place Bacon unsuccessful, unable to place catheter in urethral meatus. Provider informed , pt awaiting F/U of OBGYN. Call bell in place at side. No s/s of distress noted.
received pt from previous RN, a&ox4 at this time but appears anxious. pt awaiting OBGYN consult who is aware that zimmerman catheter was unable to be placed by previous shift. pt is in no acute distress a this time. respirations are even and nonlabored on room air. IVF infusing as ordered in LAC 20G IV catheter. no requests at this time.
suprapubic catheter placed by urology, pt tolerated well. 700ml of clear, dark yellow urine was initial output. pt in no acute distress at this time. pt awaiting uro-gyn consult. no requests at this time. respirations even and nonlabored on room air. son at bedside.

## 2024-05-21 NOTE — ED PROVIDER NOTE - NS ED ROS FT
CONSTITUTIONAL - No fever, No weight change, No lightheadedness  SKIN - No rash  HEMATOLOGIC - No abnormal bleeding or bruising  EYES - No eye pain, No blurred vision  ENT - No change in hearing, No sore throat, No neck pain, No rhinorrhea, No ear pain  RESPIRATORY - No shortness of breath, No cough  CARDIAC -No chest pain, No palpitations  GI -   + abdominal pain, No nausea, No vomiting, No diarrhea, No constipation  - No dysuria, no frequency, no hematuria.   MUSCULOSKELETAL - No joint pain, No swelling, No back pain  NEUROLOGIC - No numbness, No focal weakness, No headache, No dizziness

## 2024-05-21 NOTE — CONSULT NOTE ADULT - ASSESSMENT
84 y/o P2 post menopausal female presenting with lower abdominal pain for the past three days. CTAP showing hydrometrocolpos. 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 Given patient report that her abdominal pain has significantly improved and that she is urinating without tissue, no acute GYN intervention at this time. Discussed with patient and family member return precautions including urinary retention, fever, vaginal bleeding, severe abdominal pain at which time they should report to the ED immediately.     - Recommend topical estradiol 0.1% applied to vagina 3 times a week  - Follow up outpatient with Dr. Chacko ([phone]) next week, discussed with patient and family member further interventions including surgery may be required   - Remaining care per ED    JARRED Schofield  Seen w/ Dr. Chacko  84 y/o P2 post menopausal female presenting with lower abdominal pain for the past three days. CTAP showing hydrometrocolpos. 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 Given patient report that her abdominal pain has significantly improved and that she is urinating without tissue, no acute GYN intervention at this time. Discussed with patient and family member return precautions including urinary retention, fever, vaginal bleeding, severe abdominal pain at which time they should report to the ED immediately.     - Recommend topical estrace 0.01% applied to vagina 3 times a week  - Follow up outpatient with Dr. Chacko ([phone]) next week, discussed with patient and family member further interventions including surgery may be required   - Remaining care per ED    JARRED Schofield  Seen w/ Dr. Chacko  84 y/o P2 post menopausal female presenting with lower abdominal pain for the past three days. CTAP showing hydrometrocolpos. 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 Given patient report that her abdominal pain has significantly improved and that she is urinating without issue, no acute GYN intervention at this time. Discussed with patient and family member return precautions including urinary retention, fever, vaginal bleeding, severe abdominal pain at which time they should report to the ED immediately.     - Recommend topical estrace 0.01% applied to vagina 3 times a week  - Follow up outpatient with Dr. Chacko ([phone]) next week, discussed with patient and family member further interventions including surgery may be required   - Remaining care per ED    JARRED Schofield  Seen w/ Dr. Chacko

## 2024-05-21 NOTE — ED PROVIDER NOTE - ATTENDING CONTRIBUTION TO CARE
HPI: 83-year-old female past medical history hypertension, hypothyroidism presenting with acute onset right lower quadrant pain that began 3 days ago and has been worsening.  The pain is constant, she never had pain like this before, is not associated with nausea vomiting diarrhea chest pain shortness of breath difficulty walking.  She denies any fever or chills, dysuria, hematuria, vaginal spotting.  She has had multiple removals of ovarian cysts in the past, is uncertain if she has had an appendectomy.  Pain does not seem to worsen with any particular movement, or eating.    EXAM: Well-appearing no acute distress speaking full sentences heart is regular rate and rhythm lungs are clear to auscultation abdomen with negative Dunlap's positive McBurney's negative CVA tenderness no signs of trauma other than a well-healed midline scar.   without any hernias.  No suprapubic tenderness or masses.    MDM: 83-year-old female history of hypertension, hypothyroidism that is presenting with right lower quadrant pain and 3 days without any systemic symptoms such as fevers or chills and no nausea or vomiting.  Patient states that she usually has 1 BM every 3 to 4 days.  Last BM was 1 day ago.  He is still passing gas unlikely SBO possible appendicitis which we will explore with a CT we will also obtain labs and provide medications and IV fluids and reassess.  Also consider UTI although no urinary symptoms but location of pain can be causing this condition versus diverticulitis as well.  These will be picked up with a CT and UA.  Reassess when workup is completed in the ED.

## 2024-05-21 NOTE — ED ADULT TRIAGE NOTE - CHIEF COMPLAINT QUOTE
C/o generalized abdominal pain x 2 days. denies N/V/D, fevers, back pain, dysuria. last BM yesterday. Hx HTN. HLD, hypothyroid

## 2024-05-21 NOTE — ED ADULT NURSE NOTE - OBJECTIVE STATEMENT
Pt c/o abdominal pain level 8 out 10. Pt noted with abdomen soft , non-distended . Pt states her abdomen is tender to touch . Pt reports pain started about 2 days   ago. Pt denies nausea or vomiting .  Pt states her last meal was yesterday evening and tolerated well. Pt denies any decrease in her appetite. Pt last BM was 5/20/24, a normal brown stool. Labs drawn, pt medicated as per provider order.

## 2024-05-21 NOTE — ED PROVIDER NOTE - PHYSICAL EXAMINATION
GENERAL: Sitting comfortably in bed in no acute distress  NEURO: Alert and Oriented to person, place, date and situation. Pupils symmetric, No ptosis. No facial asymmetry or dysarthria, no tremor noted.  HEENT: No conjunctival injection or scleral icterus.   CARD: Normal rate and regular rhythm, no murmurs and no gallops appreciated.  RESP: Clear to auscultation bilaterally, No wheezes, rales or rhonchi. Good respiratory effort.  ABD: Nondistended, Soft and tender to palpation in RLQ and suprapubic area with guarding, no rebound or rigidity. No masses appreciated.  EXT: No pedal edema. 2+DP pulses bilaterally.

## 2024-05-21 NOTE — ED PROVIDER NOTE - PROVIDER TOKENS
PROVIDER:[TOKEN:[[id number]],FOLLOWUP:[7-10 Days]],PROVIDER:[TOKEN:[[id number]],FOLLOWUP:[7-10 Days]]

## 2024-05-21 NOTE — CONSULT NOTE ADULT - ATTENDING COMMENTS
84 y/o presenting to ER with UTI symptoms, abdominal/flank pain  GYN consulted as attempt to place a zimmerman failed due to stenotic vagina. CT scan also indicative of fluid in the vagina along with a distended bladder  Patient seen at bedside. Reports UTI symptoms, denies pelvic pain, vaginal bleeding or bloating  On examination, labial adhesions noted, with small superior area patent, however, could not visualize urethral orifice  Patient denies symptoms of urinary retention; states she has been urinating frequently, but has a normal stream, feels complete emptying after voiding. Can void spontaneously. Lives with her granddaughter.   No CVA tenderness noted on examination. Patient afebrile, creatinine normal. UA showing nitrites  Recommend vaginal estrogen to help break apart labial adhesions in office. Patient will follow up with me next week in office. Once labial adhesions lysed, can further evaluate etiology of fluid in endometrium/vagina, which I suspect is from urine backing up since flow is obstructed by stenotic vagina. Received treatment of ceftriaxone in ED  Return precautions discussed with patient and her son at bedside; if unable to void spontaneously, if development of fever, chills, more severe pain, to return to the ER.  No acute GYN interventions at this time.    Bashir Chacko MD  Covering GYN [facility] Attending

## 2024-05-22 RX ORDER — ESTRADIOL 0.1 MG/G
0.1 CREAM VAGINAL
Qty: 1 | Refills: 5 | Status: ACTIVE | COMMUNITY
Start: 2024-05-22 | End: 1900-01-01

## 2024-05-29 ENCOUNTER — APPOINTMENT (OUTPATIENT)
Dept: OBGYN | Facility: CLINIC | Age: 84
End: 2024-05-29
Payer: MEDICARE

## 2024-05-29 VITALS
DIASTOLIC BLOOD PRESSURE: 81 MMHG | SYSTOLIC BLOOD PRESSURE: 136 MMHG | WEIGHT: 181 LBS | HEIGHT: 65 IN | BODY MASS INDEX: 30.16 KG/M2 | HEART RATE: 98 BPM

## 2024-05-29 DIAGNOSIS — Z78.9 OTHER SPECIFIED HEALTH STATUS: ICD-10-CM

## 2024-05-29 PROCEDURE — 99204 OFFICE O/P NEW MOD 45 MIN: CPT

## 2024-05-29 PROCEDURE — 99214 OFFICE O/P EST MOD 30 MIN: CPT

## 2024-05-29 RX ORDER — RAMIPRIL 5 MG/1
CAPSULE ORAL
Refills: 0 | Status: ACTIVE | COMMUNITY

## 2024-05-29 RX ORDER — LEVOTHYROXINE SODIUM 0.17 MG/1
TABLET ORAL
Refills: 0 | Status: ACTIVE | COMMUNITY

## 2024-05-29 RX ORDER — AMLODIPINE BESYLATE 5 MG/1
TABLET ORAL
Refills: 0 | Status: ACTIVE | COMMUNITY

## 2024-05-29 RX ORDER — SIMVASTATIN 80 MG/1
TABLET, FILM COATED ORAL
Refills: 0 | Status: ACTIVE | COMMUNITY

## 2024-05-29 NOTE — PLAN
[FreeTextEntry1] : 84 y/o with urinary retention, labial adhesions  Plan: - Attempted lysis of labial adhesions in office with blunt separation, not tolerated by patient; instructed patient how to apply vaginal estrogen with assistance from a mirror - reached out to Dr. Dangelo, who will f/u with patient in office, possible need for separation of adhesions in OR - RTO PRN

## 2024-05-29 NOTE — HISTORY OF PRESENT ILLNESS
[FreeTextEntry1] : 82 y/o postmenopausal presents for ER follow up for urinary retention, labial adhesions. Was seen in ED last week for abdominal and flank pain, found to have UTI. CT scan showed distended bladder and hydrometrocolpos, zimmerman catheter was attempted but obstructed by dense labial adhesions. A suprapubic catheter was placed by urology team, and GYN recommended vaginal estrogen cream to help break apart labial adhesions. Patient seen in office today, uncomfortable with SPC but completed antibiotics for UTI. Admits she has not been consistent with vaginal estrogen cream because she cannot see very well.

## 2024-05-29 NOTE — PHYSICAL EXAM
[Chaperone Present] : A chaperone was present in the examining room during all aspects of the physical examination [FreeTextEntry2] : Betsy MALONEY [Appropriately responsive] : appropriately responsive [Alert] : alert [No Acute Distress] : no acute distress [Regular Rate Rhythm] : regular rate rhythm [Oriented x3] : oriented x3 [FreeTextEntry5] : non labored breathing [FreeTextEntry7] : SPC in place, draining urine [FreeTextEntry1] : Dense labial adhesions

## 2024-06-04 ENCOUNTER — APPOINTMENT (OUTPATIENT)
Dept: OBGYN | Facility: CLINIC | Age: 84
End: 2024-06-04
Payer: MEDICARE

## 2024-06-04 VITALS
HEART RATE: 94 BPM | DIASTOLIC BLOOD PRESSURE: 76 MMHG | BODY MASS INDEX: 30.16 KG/M2 | WEIGHT: 181 LBS | SYSTOLIC BLOOD PRESSURE: 128 MMHG | HEIGHT: 65 IN

## 2024-06-04 PROCEDURE — 99213 OFFICE O/P EST LOW 20 MIN: CPT

## 2024-06-04 NOTE — HISTORY OF PRESENT ILLNESS
[FreeTextEntry1] : 84 y/o with labial adhesions, urinary retention presents for follow up Has been applying vaginal estrogen more frequently, still unsure if it is in the right place. Doing better with zimmerman catheter. Has follow up with urology tomorrow.

## 2024-06-04 NOTE — PLAN
[FreeTextEntry1] : 82 y/o with labial adhesions, urinary retention, for follow up  Plan: - reached out to Dr. Dangelo, office was having difficulty getting in touch with patient; provided patient's son's telephone number to move up appt with Dr. Dangelo to a sooner date/time - communicated plan to kwame Simmonsith - RTO PRN

## 2024-06-04 NOTE — PHYSICAL EXAM
[Chaperone Present] : A chaperone was present in the examining room during all aspects of the physical examination [Appropriately responsive] : appropriately responsive [Alert] : alert [No Acute Distress] : no acute distress [Regular Rate Rhythm] : regular rate rhythm [Oriented x3] : oriented x3 [FreeTextEntry5] : non labored breathing [Normal] : normal external genitalia [FreeTextEntry2] : Dense labial adhesions, unable to lyse at bedside

## 2024-06-05 ENCOUNTER — APPOINTMENT (OUTPATIENT)
Dept: UROLOGY | Facility: CLINIC | Age: 84
End: 2024-06-05

## 2024-06-11 ENCOUNTER — APPOINTMENT (OUTPATIENT)
Dept: UROLOGY | Facility: CLINIC | Age: 84
End: 2024-06-11
Payer: MEDICARE

## 2024-06-11 VITALS
WEIGHT: 178 LBS | DIASTOLIC BLOOD PRESSURE: 76 MMHG | HEART RATE: 89 BPM | OXYGEN SATURATION: 95 % | TEMPERATURE: 98 F | SYSTOLIC BLOOD PRESSURE: 134 MMHG | RESPIRATION RATE: 16 BRPM | BODY MASS INDEX: 29.66 KG/M2 | HEIGHT: 65 IN

## 2024-06-11 DIAGNOSIS — Z63.4 DISAPPEARANCE AND DEATH OF FAMILY MEMBER: ICD-10-CM

## 2024-06-11 DIAGNOSIS — Z87.42 PERSONAL HISTORY OF OTHER DISEASES OF THE FEMALE GENITAL TRACT: ICD-10-CM

## 2024-06-11 DIAGNOSIS — N90.89 OTHER SPECIFIED NONINFLAMMATORY DISORDERS OF VULVA AND PERINEUM: ICD-10-CM

## 2024-06-11 DIAGNOSIS — R33.9 RETENTION OF URINE, UNSPECIFIED: ICD-10-CM

## 2024-06-11 PROCEDURE — 99204 OFFICE O/P NEW MOD 45 MIN: CPT

## 2024-06-11 RX ORDER — CHOLECALCIFEROL (VITAMIN D3) 50 MCG
50 MCG TABLET ORAL
Refills: 0 | Status: ACTIVE | COMMUNITY

## 2024-06-11 SDOH — SOCIAL STABILITY - SOCIAL INSECURITY: DISSAPEARANCE AND DEATH OF FAMILY MEMBER: Z63.4

## 2024-06-11 NOTE — ASSESSMENT
[FreeTextEntry1] : 84-year-old female who presents for dense labial adhesions  Discussed with patient that she would need a lysis of adhesions.  This should take no more than 1 hour and would be an outpatient surgery.  The important thing would be local wound care postprocedure to ensure that the two labia do not heal fused.  We would send her home with wound care instructions.  Patient will require medical clearance.  We can aim to do this at [facility] versus the [facility], I will have the surgery scheduler look into appropriate dates.  The patient also has a suprapubic tube.  This was placed on 5/21.  Explained that the first exchange happens at 6 weeks.  If the timing of the surgery aligns, we can exchange the tube in the operating room.  Explained that I would plan to leave it in place to ensure that she is voiding properly post procedure.  We can perform capping trials at her postop appointment.  If we are unable to find a date around 6 weeks, we will bring her back in for an exchange in the office.  All questions answered by family and patient.

## 2024-06-11 NOTE — PHYSICAL EXAM
[Normal Appearance] : normal appearance [Well Groomed] : well groomed [General Appearance - In No Acute Distress] : no acute distress [] : no rash [No Focal Deficits] : no focal deficits [Oriented To Time, Place, And Person] : oriented to person, place, and time [Affect] : the affect was normal [Mood] : the mood was normal [de-identified] : +SPT with clear urine [de-identified] : +fused labia, no introitus to exam, probed with q tip but unable to find orifice

## 2024-06-11 NOTE — REVIEW OF SYSTEMS
[Eyesight Problems] : eyesight problems [Abdominal Pain] : abdominal pain [Constipation] : constipation [Genital bacterial infection] : genital bacterial infection [Date of last menstrual period ____] : date of last menstrual period: [unfilled] [Wake up at night to urinate  How many times?  ___] : wakes up to urinate [unfilled] times during the night [Strong urge to urinate] : strong urge to urinate [Bladder pressure] : experiences bladder pressure [Leakage of urine with straining, coughing, laughing] : leakage of urine with straining, coughing, laughing [Negative] : Heme/Lymph

## 2024-06-11 NOTE — REASON FOR VISIT
[TextEntry] : 84-year-old female who presents for labial adhesions  Patient recently went to [facility] in retention.  The 1 to 2 weeks leading up to her visit, she developed urinary issues and suprapubic discomfort.  When she went to the emergency room, she was found to have completely fused labia.  Suprapubic tube was placed emergently by the urology team on .  The patient denies any prior urologic issues.  She has had no issues with the suprapubic catheter.  She has been managing at home by herself. She trialed the estrogen cream but had no significant change in her adhesions.   She is a  via vaginal delivery.  She had a history of ovarian cyst removal x 2.  She denies any cardiac or respiratory issues.  She is on any blood thinners.  She takes medication for her blood pressure.

## 2024-06-21 ENCOUNTER — OUTPATIENT (OUTPATIENT)
Dept: OUTPATIENT SERVICES | Facility: HOSPITAL | Age: 84
LOS: 1 days | End: 2024-06-21
Payer: MEDICARE

## 2024-06-21 VITALS
HEIGHT: 65 IN | HEART RATE: 90 BPM | WEIGHT: 177.91 LBS | RESPIRATION RATE: 18 BRPM | SYSTOLIC BLOOD PRESSURE: 129 MMHG | TEMPERATURE: 99 F | DIASTOLIC BLOOD PRESSURE: 77 MMHG | OXYGEN SATURATION: 97 %

## 2024-06-21 DIAGNOSIS — Z98.890 OTHER SPECIFIED POSTPROCEDURAL STATES: Chronic | ICD-10-CM

## 2024-06-21 DIAGNOSIS — N90.89 OTHER SPECIFIED NONINFLAMMATORY DISORDERS OF VULVA AND PERINEUM: ICD-10-CM

## 2024-06-21 DIAGNOSIS — Z98.49 CATARACT EXTRACTION STATUS, UNSPECIFIED EYE: Chronic | ICD-10-CM

## 2024-06-21 DIAGNOSIS — I10 ESSENTIAL (PRIMARY) HYPERTENSION: ICD-10-CM

## 2024-06-21 DIAGNOSIS — Z93.59 OTHER CYSTOSTOMY STATUS: Chronic | ICD-10-CM

## 2024-06-21 DIAGNOSIS — Z87.898 PERSONAL HISTORY OF OTHER SPECIFIED CONDITIONS: ICD-10-CM

## 2024-06-21 DIAGNOSIS — Z01.818 ENCOUNTER FOR OTHER PREPROCEDURAL EXAMINATION: ICD-10-CM

## 2024-06-21 LAB
ANION GAP SERPL CALC-SCNC: 13 MMOL/L — SIGNIFICANT CHANGE UP (ref 5–17)
BUN SERPL-MCNC: 14 MG/DL — SIGNIFICANT CHANGE UP (ref 7–23)
CALCIUM SERPL-MCNC: 9.6 MG/DL — SIGNIFICANT CHANGE UP (ref 8.4–10.5)
CHLORIDE SERPL-SCNC: 103 MMOL/L — SIGNIFICANT CHANGE UP (ref 96–108)
CO2 SERPL-SCNC: 24 MMOL/L — SIGNIFICANT CHANGE UP (ref 22–31)
CREAT SERPL-MCNC: 0.6 MG/DL — SIGNIFICANT CHANGE UP (ref 0.5–1.3)
EGFR: 88 ML/MIN/1.73M2 — SIGNIFICANT CHANGE UP
GLUCOSE SERPL-MCNC: 129 MG/DL — HIGH (ref 70–99)
HCT VFR BLD CALC: 44.7 % — SIGNIFICANT CHANGE UP (ref 34.5–45)
HGB BLD-MCNC: 15.1 G/DL — SIGNIFICANT CHANGE UP (ref 11.5–15.5)
MCHC RBC-ENTMCNC: 31.1 PG — SIGNIFICANT CHANGE UP (ref 27–34)
MCHC RBC-ENTMCNC: 33.8 GM/DL — SIGNIFICANT CHANGE UP (ref 32–36)
MCV RBC AUTO: 92 FL — SIGNIFICANT CHANGE UP (ref 80–100)
NRBC # BLD: 0 /100 WBCS — SIGNIFICANT CHANGE UP (ref 0–0)
PLATELET # BLD AUTO: 282 K/UL — SIGNIFICANT CHANGE UP (ref 150–400)
POTASSIUM SERPL-MCNC: 4.2 MMOL/L — SIGNIFICANT CHANGE UP (ref 3.5–5.3)
POTASSIUM SERPL-SCNC: 4.2 MMOL/L — SIGNIFICANT CHANGE UP (ref 3.5–5.3)
RBC # BLD: 4.86 M/UL — SIGNIFICANT CHANGE UP (ref 3.8–5.2)
RBC # FLD: 12.6 % — SIGNIFICANT CHANGE UP (ref 10.3–14.5)
SODIUM SERPL-SCNC: 140 MMOL/L — SIGNIFICANT CHANGE UP (ref 135–145)
WBC # BLD: 8.34 K/UL — SIGNIFICANT CHANGE UP (ref 3.8–10.5)
WBC # FLD AUTO: 8.34 K/UL — SIGNIFICANT CHANGE UP (ref 3.8–10.5)

## 2024-06-21 PROCEDURE — 80048 BASIC METABOLIC PNL TOTAL CA: CPT

## 2024-06-21 PROCEDURE — 85027 COMPLETE CBC AUTOMATED: CPT

## 2024-06-21 PROCEDURE — G0463: CPT

## 2024-06-21 PROCEDURE — 87086 URINE CULTURE/COLONY COUNT: CPT

## 2024-06-21 PROCEDURE — 36415 COLL VENOUS BLD VENIPUNCTURE: CPT

## 2024-06-21 RX ORDER — LIDOCAINE HYDROCHLORIDE 20 MG/ML
0.2 INJECTION, SOLUTION EPIDURAL; INFILTRATION; INTRACAUDAL; PERINEURAL ONCE
Refills: 0 | Status: DISCONTINUED | OUTPATIENT
Start: 2024-06-27 | End: 2024-07-11

## 2024-06-21 RX ORDER — DEXTROSE MONOHYDRATE AND SODIUM CHLORIDE 5; .3 G/100ML; G/100ML
1000 INJECTION, SOLUTION INTRAVENOUS
Refills: 0 | Status: DISCONTINUED | OUTPATIENT
Start: 2024-06-27 | End: 2024-07-11

## 2024-06-23 LAB
CULTURE RESULTS: SIGNIFICANT CHANGE UP
SPECIMEN SOURCE: SIGNIFICANT CHANGE UP

## 2024-06-24 ENCOUNTER — NON-APPOINTMENT (OUTPATIENT)
Age: 84
End: 2024-06-24

## 2024-06-24 RX ORDER — AMOXICILLIN AND CLAVULANATE POTASSIUM 875; 125 MG/1; MG/1
875-125 TABLET, COATED ORAL
Qty: 10 | Refills: 0 | Status: ACTIVE | COMMUNITY
Start: 2024-06-24 | End: 1900-01-01

## 2024-06-27 ENCOUNTER — TRANSCRIPTION ENCOUNTER (OUTPATIENT)
Age: 84
End: 2024-06-27

## 2024-06-27 ENCOUNTER — APPOINTMENT (OUTPATIENT)
Dept: UROLOGY | Facility: HOSPITAL | Age: 84
End: 2024-06-27

## 2024-06-27 ENCOUNTER — OUTPATIENT (OUTPATIENT)
Dept: OUTPATIENT SERVICES | Facility: HOSPITAL | Age: 84
LOS: 1 days | End: 2024-06-27
Payer: MEDICARE

## 2024-06-27 VITALS
HEART RATE: 72 BPM | TEMPERATURE: 97 F | SYSTOLIC BLOOD PRESSURE: 113 MMHG | RESPIRATION RATE: 14 BRPM | DIASTOLIC BLOOD PRESSURE: 59 MMHG | OXYGEN SATURATION: 98 %

## 2024-06-27 VITALS
HEART RATE: 92 BPM | DIASTOLIC BLOOD PRESSURE: 68 MMHG | TEMPERATURE: 98 F | WEIGHT: 177.91 LBS | OXYGEN SATURATION: 95 % | HEIGHT: 65 IN | RESPIRATION RATE: 18 BRPM | SYSTOLIC BLOOD PRESSURE: 119 MMHG

## 2024-06-27 DIAGNOSIS — N90.89 OTHER SPECIFIED NONINFLAMMATORY DISORDERS OF VULVA AND PERINEUM: ICD-10-CM

## 2024-06-27 DIAGNOSIS — Z98.890 OTHER SPECIFIED POSTPROCEDURAL STATES: Chronic | ICD-10-CM

## 2024-06-27 DIAGNOSIS — Z93.59 OTHER CYSTOSTOMY STATUS: Chronic | ICD-10-CM

## 2024-06-27 DIAGNOSIS — Z98.49 CATARACT EXTRACTION STATUS, UNSPECIFIED EYE: Chronic | ICD-10-CM

## 2024-06-27 DIAGNOSIS — Z01.818 ENCOUNTER FOR OTHER PREPROCEDURAL EXAMINATION: ICD-10-CM

## 2024-06-27 PROCEDURE — 56441 LYSIS OF LABIAL ADHESIONS: CPT

## 2024-06-27 PROCEDURE — 51705 CHANGE OF BLADDER TUBE: CPT

## 2024-06-27 RX ORDER — AMLODIPINE BESYLATE 2.5 MG/1
1 TABLET ORAL
Refills: 0 | DISCHARGE

## 2024-06-27 RX ORDER — CYANOCOBALAMIN (VITAMIN B-12) 1000 MCG
1 TABLET, EXTENDED RELEASE ORAL
Refills: 0 | DISCHARGE

## 2024-06-27 RX ORDER — CYANOCOBALAMIN (VITAMIN B-12) 1000 MCG
0 TABLET, EXTENDED RELEASE ORAL
Refills: 0 | DISCHARGE

## 2024-06-27 RX ORDER — BACITRACIN 500 UNIT/G
1 OINTMENT (GRAM) TOPICAL
Qty: 1 | Refills: 0
Start: 2024-06-27

## 2024-06-27 RX ORDER — CEFAZOLIN 10 G/1
2000 INJECTION, POWDER, FOR SOLUTION INTRAVENOUS ONCE
Refills: 0 | Status: COMPLETED | OUTPATIENT
Start: 2024-06-27 | End: 2024-06-27

## 2024-06-27 RX ORDER — LEVOTHYROXINE SODIUM 25 MCG
1 TABLET ORAL
Refills: 0 | DISCHARGE

## 2024-06-27 RX ORDER — TRAMADOL HYDROCHLORIDE 50 MG/1
1 TABLET, FILM COATED ORAL
Qty: 12 | Refills: 0
Start: 2024-06-27

## 2024-06-27 RX ORDER — RAMIPRIL 10 MG/1
1 CAPSULE ORAL
Refills: 0 | DISCHARGE

## 2024-06-27 RX ORDER — CYCLOSPORINE 0.5 MG/ML
1 EMULSION OPHTHALMIC
Refills: 0 | DISCHARGE

## 2024-06-27 RX ORDER — SIMVASTATIN 40 MG
1 TABLET ORAL
Refills: 0 | DISCHARGE

## 2024-06-28 PROBLEM — H04.123 DRY EYE SYNDROME OF BILATERAL LACRIMAL GLANDS: Chronic | Status: ACTIVE | Noted: 2024-06-21

## 2024-06-28 PROBLEM — D25.9 LEIOMYOMA OF UTERUS, UNSPECIFIED: Chronic | Status: ACTIVE | Noted: 2024-06-21

## 2024-06-28 PROBLEM — Z86.39 PERSONAL HISTORY OF OTHER ENDOCRINE, NUTRITIONAL AND METABOLIC DISEASE: Chronic | Status: ACTIVE | Noted: 2024-06-21

## 2024-07-15 ENCOUNTER — APPOINTMENT (OUTPATIENT)
Dept: UROLOGY | Facility: CLINIC | Age: 84
End: 2024-07-15
Payer: MEDICARE

## 2024-07-15 DIAGNOSIS — N90.89 OTHER SPECIFIED NONINFLAMMATORY DISORDERS OF VULVA AND PERINEUM: ICD-10-CM

## 2024-07-15 DIAGNOSIS — R33.9 RETENTION OF URINE, UNSPECIFIED: ICD-10-CM

## 2024-07-15 DIAGNOSIS — Z87.898 PERSONAL HISTORY OF OTHER SPECIFIED CONDITIONS: ICD-10-CM

## 2024-07-15 DIAGNOSIS — N95.2 POSTMENOPAUSAL ATROPHIC VAGINITIS: ICD-10-CM

## 2024-07-15 PROCEDURE — 99214 OFFICE O/P EST MOD 30 MIN: CPT

## 2024-07-15 PROCEDURE — 51798 US URINE CAPACITY MEASURE: CPT

## 2024-07-15 PROCEDURE — G2211 COMPLEX E/M VISIT ADD ON: CPT

## 2024-07-15 RX ORDER — ESTRADIOL 0.1 MG/G
0.1 CREAM VAGINAL
Qty: 1 | Refills: 3 | Status: ACTIVE | COMMUNITY
Start: 2024-07-15 | End: 1900-01-01

## 2024-08-23 ENCOUNTER — APPOINTMENT (OUTPATIENT)
Dept: UROLOGY | Facility: CLINIC | Age: 84
End: 2024-08-23

## 2024-08-30 ENCOUNTER — APPOINTMENT (OUTPATIENT)
Dept: UROLOGY | Facility: CLINIC | Age: 84
End: 2024-08-30
Payer: MEDICARE

## 2024-08-30 DIAGNOSIS — N95.2 POSTMENOPAUSAL ATROPHIC VAGINITIS: ICD-10-CM

## 2024-08-30 DIAGNOSIS — N90.89 OTHER SPECIFIED NONINFLAMMATORY DISORDERS OF VULVA AND PERINEUM: ICD-10-CM

## 2024-08-30 PROCEDURE — 99213 OFFICE O/P EST LOW 20 MIN: CPT

## 2024-08-30 PROCEDURE — G2211 COMPLEX E/M VISIT ADD ON: CPT

## 2024-08-30 NOTE — REASON FOR VISIT
[TextEntry] : 84-year-old female who presents for labial adhesions  Patient recently went to [facility] in retention.  The 1 to 2 weeks leading up to her visit, she developed urinary issues and suprapubic discomfort.  When she went to the emergency room, she was found to have completely fused labia.  Suprapubic tube was placed emergently by the urology team on .  The patient denies any prior urologic issues.  She has had no issues with the suprapubic catheter.  She has been managing at home by herself. She trialed the estrogen cream but had no significant change in her adhesions.   She is a  via vaginal delivery.  She had a history of ovarian cyst removal x 2.  She denies any cardiac or respiratory issues.  She is on any blood thinners.  She takes medication for her blood pressure.  Patient counseled about options and underwent lysis of labial adhesions, cystoscopy, suprapubic tube exchange on 2024.   Patient had a void trial in the recovery area and her suprapubic tube was removed.  Today on 7/15 patient reports that overall she is doing well.  She endorses some incontinence with voiding but otherwise denies any issues.  She has been applying the bacitracin twice daily.  She denies any issues with leakage from the suprapubic tube site. PVR 169cc Abdomen:. +SPT site well healed, +erythema in pannus fold, +white fuzzy palpable lesion 3cm above suprapubic site.   Genitourinary:. +vaginal atrophy, patient introitus.   Advised her to continue bacitracin 1-2 times per day.  We also started her on vaginal estrogen cream.  For incomplete emptying I recommended double voiding.  For suprapubic lesion, recommended dermatology  Today on  patient reports that she stopped using the estrogen cream after 2 weeks.  Her introitus is still patent.  She is voiding without difficulty from below.  She did see dermatology who prescribed a powder and cream for suprapubic lesion.

## 2024-08-30 NOTE — ASSESSMENT
[FreeTextEntry1] : 84-year-old female who presents for labial adhesions and vaginal atrophy  Advised to continue using estrogen cream 3 nights per week to prevent future adhesion.  Return to clinic in 6 months to reassess

## 2024-08-30 NOTE — PHYSICAL EXAM
[Normal Appearance] : normal appearance [Well Groomed] : well groomed [General Appearance - In No Acute Distress] : no acute distress [] : no rash [Affect] : the affect was normal [Mood] : the mood was normal [de-identified] : +SPT site well healed [de-identified] : +vaginal atrophy, patient introitus

## 2025-01-21 ENCOUNTER — APPOINTMENT (OUTPATIENT)
Dept: UROLOGY | Facility: CLINIC | Age: 85
End: 2025-01-21

## 2025-03-24 ENCOUNTER — APPOINTMENT (OUTPATIENT)
Dept: OTOLARYNGOLOGY | Facility: CLINIC | Age: 85
End: 2025-03-24
Payer: MEDICARE

## 2025-03-24 VITALS — HEIGHT: 65 IN | WEIGHT: 170 LBS | BODY MASS INDEX: 28.32 KG/M2

## 2025-03-24 DIAGNOSIS — H61.23 IMPACTED CERUMEN, BILATERAL: ICD-10-CM

## 2025-03-24 DIAGNOSIS — H90.3 SENSORINEURAL HEARING LOSS, BILATERAL: ICD-10-CM

## 2025-03-24 PROCEDURE — 69210 REMOVE IMPACTED EAR WAX UNI: CPT

## 2025-03-24 PROCEDURE — 99213 OFFICE O/P EST LOW 20 MIN: CPT | Mod: 25

## 2025-04-18 ENCOUNTER — APPOINTMENT (OUTPATIENT)
Dept: UROLOGY | Facility: CLINIC | Age: 85
End: 2025-04-18
Payer: MEDICARE

## 2025-04-18 VITALS
HEART RATE: 74 BPM | OXYGEN SATURATION: 94 % | DIASTOLIC BLOOD PRESSURE: 81 MMHG | SYSTOLIC BLOOD PRESSURE: 139 MMHG | RESPIRATION RATE: 16 BRPM

## 2025-04-18 DIAGNOSIS — N95.2 POSTMENOPAUSAL ATROPHIC VAGINITIS: ICD-10-CM

## 2025-04-18 DIAGNOSIS — N90.89 OTHER SPECIFIED NONINFLAMMATORY DISORDERS OF VULVA AND PERINEUM: ICD-10-CM

## 2025-04-18 PROCEDURE — 99213 OFFICE O/P EST LOW 20 MIN: CPT

## 2025-04-18 PROCEDURE — 99459 PELVIC EXAMINATION: CPT

## 2025-04-18 PROCEDURE — G2211 COMPLEX E/M VISIT ADD ON: CPT

## 2025-09-03 ENCOUNTER — APPOINTMENT (OUTPATIENT)
Dept: UROLOGY | Facility: CLINIC | Age: 85
End: 2025-09-03

## (undated) DEVICE — DRAPE MAYO STAND 30"

## (undated) DEVICE — SYR LUER LOK 10CC

## (undated) DEVICE — TUBING THERMADX UROLOGY

## (undated) DEVICE — SUT SOFSILK 2-0 18" C-23

## (undated) DEVICE — FOLEY CATH 2-WAY 14FR 5CC SILICONE

## (undated) DEVICE — VENODYNE/SCD SLEEVE CALF MEDIUM

## (undated) DEVICE — PACK GENERAL MINOR

## (undated) DEVICE — SUT NYLON 3-0 18" PS-2

## (undated) DEVICE — FOLEY CATH PLUG

## (undated) DEVICE — GLV 8 PROTEXIS (WHITE)

## (undated) DEVICE — PREP CHLORAPREP HI-LITE ORANGE 26ML

## (undated) DEVICE — DRAPE 3/4 SHEET W REINFORCEMENT 56X77"

## (undated) DEVICE — SOL IRR POUR H2O 250ML

## (undated) DEVICE — WARMING BLANKET UPPER ADULT

## (undated) DEVICE — NDL HYPO REGULAR BEVEL 22G X 1.5" (TURQUOISE)

## (undated) DEVICE — POSITIONER FOAM EGG CRATE ULNAR 2PCS (PINK)

## (undated) DEVICE — DRAPE INSTRUMENT POUCH 6.75" X 11"

## (undated) DEVICE — SPECIMEN CONTAINER 100ML

## (undated) DEVICE — POSITIONER FOAM HEADREST (PINK)

## (undated) DEVICE — BLADE SCALPEL SAFETYLOCK #15

## (undated) DEVICE — ELCTR GROUNDING PAD ADULT COVIDIEN

## (undated) DEVICE — SOL IRR BAG H2O 3000ML

## (undated) DEVICE — TUBING SUCTION 20FT

## (undated) DEVICE — MEDICATION LABELS W MARKER

## (undated) DEVICE — DRSG CURITY GAUZE SPONGE 4 X 4" 12-PLY

## (undated) DEVICE — DRAPE TOWEL BLUE 17" X 24"

## (undated) DEVICE — PREP BETADINE KIT

## (undated) DEVICE — SOL IRR BAG NS 0.9% 3000ML

## (undated) DEVICE — DRAPE LIGHT HANDLE COVER (BLUE)

## (undated) DEVICE — FOLEY CATH 2-WAY 16FR 5CC LATEX LUBRICATH

## (undated) DEVICE — FOLEY HOLDER STATLOCK 2 WAY ADULT

## (undated) DEVICE — DRAPE LINGEMAN TUR

## (undated) DEVICE — SOL IRR POUR NS 0.9% 500ML

## (undated) DEVICE — TUBING TUR 2 PRONG

## (undated) DEVICE — SUT POLYSORB 3-0 30" V-20 UNDYED

## (undated) DEVICE — BAG URINE W METER 2L

## (undated) DEVICE — GOWN TRIMAX LG